# Patient Record
Sex: MALE | Race: BLACK OR AFRICAN AMERICAN | NOT HISPANIC OR LATINO | Employment: FULL TIME | URBAN - METROPOLITAN AREA
[De-identification: names, ages, dates, MRNs, and addresses within clinical notes are randomized per-mention and may not be internally consistent; named-entity substitution may affect disease eponyms.]

---

## 2019-02-27 ENCOUNTER — HOSPITAL ENCOUNTER (EMERGENCY)
Facility: HOSPITAL | Age: 24
Discharge: HOME | End: 2019-02-27
Attending: EMERGENCY MEDICINE | Admitting: EMERGENCY MEDICINE

## 2019-02-27 VITALS
SYSTOLIC BLOOD PRESSURE: 146 MMHG | DIASTOLIC BLOOD PRESSURE: 79 MMHG | HEART RATE: 107 BPM | RESPIRATION RATE: 18 BRPM | BODY MASS INDEX: 23.07 KG/M2 | WEIGHT: 147 LBS | TEMPERATURE: 98.1 F | HEIGHT: 67 IN | OXYGEN SATURATION: 95 %

## 2019-02-27 DIAGNOSIS — N34.2 URETHRITIS: Primary | ICD-10-CM

## 2019-02-27 LAB
BACTERIA UR QL AUTO: ABNORMAL /HPF
BILIRUB UR QL STRIP: NEGATIVE
CLARITY UR: CLEAR
COLOR UR: YELLOW
GLUCOSE UR STRIP-MCNC: NEGATIVE MG/DL
HGB UR QL STRIP.AUTO: NEGATIVE
HYALINE CASTS UR QL AUTO: ABNORMAL /LPF
KETONES UR QL STRIP: ABNORMAL
LEUKOCYTE ESTERASE UR QL STRIP.AUTO: ABNORMAL
NITRITE UR QL STRIP: NEGATIVE
PH UR STRIP.AUTO: 7.5 [PH] (ref 5–8)
PROT UR QL STRIP: ABNORMAL
RBC # UR: ABNORMAL /HPF
REF LAB TEST METHOD: ABNORMAL
SP GR UR STRIP: 1.02 (ref 1–1.03)
SQUAMOUS #/AREA URNS HPF: ABNORMAL /HPF
UROBILINOGEN UR QL STRIP: ABNORMAL
WBC UR QL AUTO: ABNORMAL /HPF

## 2019-02-27 PROCEDURE — 96372 THER/PROPH/DIAG INJ SC/IM: CPT

## 2019-02-27 PROCEDURE — 81001 URINALYSIS AUTO W/SCOPE: CPT | Performed by: EMERGENCY MEDICINE

## 2019-02-27 PROCEDURE — 99283 EMERGENCY DEPT VISIT LOW MDM: CPT

## 2019-02-27 PROCEDURE — 87086 URINE CULTURE/COLONY COUNT: CPT | Performed by: EMERGENCY MEDICINE

## 2019-02-27 PROCEDURE — 25010000002 CEFTRIAXONE PER 250 MG: Performed by: EMERGENCY MEDICINE

## 2019-02-27 RX ORDER — CEFTRIAXONE 1 G/1
1000 INJECTION, POWDER, FOR SOLUTION INTRAMUSCULAR; INTRAVENOUS ONCE
Status: COMPLETED | OUTPATIENT
Start: 2019-02-27 | End: 2019-02-27

## 2019-02-27 RX ORDER — AZITHROMYCIN 250 MG/1
1000 TABLET, FILM COATED ORAL ONCE
Status: COMPLETED | OUTPATIENT
Start: 2019-02-27 | End: 2019-02-27

## 2019-02-27 RX ORDER — CIPROFLOXACIN 500 MG/1
500 TABLET, FILM COATED ORAL 2 TIMES DAILY
Qty: 14 TABLET | Refills: 0 | Status: SHIPPED | OUTPATIENT
Start: 2019-02-27 | End: 2019-03-08 | Stop reason: SDUPTHER

## 2019-02-27 RX ADMIN — CEFTRIAXONE SODIUM 1000 MG: 1 INJECTION, POWDER, FOR SOLUTION INTRAMUSCULAR; INTRAVENOUS at 14:16

## 2019-02-27 RX ADMIN — AZITHROMYCIN 1000 MG: 250 TABLET, FILM COATED ORAL at 14:16

## 2019-03-01 LAB — BACTERIA SPEC AEROBE CULT: NO GROWTH

## 2019-03-07 ENCOUNTER — HOSPITAL ENCOUNTER (EMERGENCY)
Facility: HOSPITAL | Age: 24
Discharge: HOME | End: 2019-03-08
Attending: EMERGENCY MEDICINE | Admitting: EMERGENCY MEDICINE

## 2019-03-07 DIAGNOSIS — N34.2 URETHRITIS: Primary | ICD-10-CM

## 2019-03-07 LAB
BACTERIA UR QL AUTO: ABNORMAL /HPF
BILIRUB UR QL STRIP: NEGATIVE
CLARITY UR: CLEAR
COLOR UR: ABNORMAL
GLUCOSE UR STRIP-MCNC: NEGATIVE MG/DL
HGB UR QL STRIP.AUTO: NEGATIVE
HYALINE CASTS UR QL AUTO: ABNORMAL /LPF
KETONES UR QL STRIP: NEGATIVE
LEUKOCYTE ESTERASE UR QL STRIP.AUTO: NEGATIVE
NITRITE UR QL STRIP: NEGATIVE
PH UR STRIP.AUTO: <=5 [PH] (ref 5–8)
PROT UR QL STRIP: ABNORMAL
RBC # UR: ABNORMAL /HPF
REF LAB TEST METHOD: ABNORMAL
SP GR UR STRIP: 1.03 (ref 1–1.03)
SQUAMOUS #/AREA URNS HPF: ABNORMAL /HPF
UROBILINOGEN UR QL STRIP: ABNORMAL
WBC UR QL AUTO: ABNORMAL /HPF

## 2019-03-07 PROCEDURE — 81001 URINALYSIS AUTO W/SCOPE: CPT | Performed by: EMERGENCY MEDICINE

## 2019-03-07 PROCEDURE — 87491 CHLMYD TRACH DNA AMP PROBE: CPT | Performed by: EMERGENCY MEDICINE

## 2019-03-07 PROCEDURE — 87591 N.GONORRHOEAE DNA AMP PROB: CPT | Performed by: EMERGENCY MEDICINE

## 2019-03-07 PROCEDURE — 99283 EMERGENCY DEPT VISIT LOW MDM: CPT

## 2019-03-08 VITALS
BODY MASS INDEX: 21.55 KG/M2 | WEIGHT: 137.3 LBS | SYSTOLIC BLOOD PRESSURE: 114 MMHG | RESPIRATION RATE: 18 BRPM | DIASTOLIC BLOOD PRESSURE: 72 MMHG | HEIGHT: 67 IN | OXYGEN SATURATION: 96 % | HEART RATE: 66 BPM | TEMPERATURE: 98.1 F

## 2019-03-08 RX ORDER — CIPROFLOXACIN 500 MG/1
500 TABLET, FILM COATED ORAL 2 TIMES DAILY
Qty: 14 TABLET | Refills: 0 | OUTPATIENT
Start: 2019-03-08 | End: 2019-06-18

## 2019-03-08 NOTE — ED PROVIDER NOTES
" EMERGENCY DEPARTMENT ENCOUNTER    CHIEF COMPLAINT  Chief Complaint: difficulty urinating  History given by: patient  History limited by: nothing  Room Number: 19/19  PMD: Provider, No Known      HPI:  Pt is a 23 y.o. male who presents complaining of difficulty urinating over the last 2 days. Pt states that he then developed an intermittent \"watery\" sensation on his buttocks and down his bilateral thighs. Pt denies bloody stool, fever, N/V, penile discharge or drainage.    Duration:  2 days  Onset: gradual  Timing: intermittent  Location:   Radiation: N/A  Quality: difficulty urinating  Intensity/Severity: moderate  Progression: worsening  Associated Symptoms: \"watery\" sensation on buttocks and bilateral thighs  Aggravating Factors: none  Alleviating Factors: none  Previous Episodes: none mentioned  Treatment before arrival: none    PAST MEDICAL HISTORY  Active Ambulatory Problems     Diagnosis Date Noted   • No Active Ambulatory Problems     Resolved Ambulatory Problems     Diagnosis Date Noted   • No Resolved Ambulatory Problems     No Additional Past Medical History       PAST SURGICAL HISTORY  History reviewed. No pertinent surgical history.    FAMILY HISTORY  History reviewed. No pertinent family history.    SOCIAL HISTORY  Social History     Socioeconomic History   • Marital status: Single     Spouse name: Not on file   • Number of children: Not on file   • Years of education: Not on file   • Highest education level: Not on file   Social Needs   • Financial resource strain: Not on file   • Food insecurity - worry: Not on file   • Food insecurity - inability: Not on file   • Transportation needs - medical: Not on file   • Transportation needs - non-medical: Not on file   Occupational History   • Not on file   Tobacco Use   • Smoking status: Never Smoker   Substance and Sexual Activity   • Alcohol use: No     Frequency: Never   • Drug use: No   • Sexual activity: Not on file   Other Topics Concern   • Not on " "file   Social History Narrative   • Not on file       ALLERGIES  Patient has no known allergies.    REVIEW OF SYSTEMS  Review of Systems   Constitutional: Negative for activity change, appetite change and fever.   HENT: Negative for congestion and sore throat.    Eyes: Negative.    Respiratory: Negative for cough and shortness of breath.    Cardiovascular: Negative for chest pain and leg swelling.   Gastrointestinal: Negative for abdominal pain, diarrhea and vomiting.        (+)  \"watery\" sensation on buttocks and bilateral thighs   Endocrine: Negative.    Genitourinary: Positive for difficulty urinating. Negative for decreased urine volume, discharge, dysuria and penile pain.   Musculoskeletal: Negative for neck pain.   Skin: Negative for rash and wound.   Allergic/Immunologic: Negative.    Neurological: Negative for weakness, numbness and headaches.   Hematological: Negative.    Psychiatric/Behavioral: Negative.    All other systems reviewed and are negative.      PHYSICAL EXAM  ED Triage Vitals   Temp Heart Rate Resp BP SpO2   03/07/19 2227 03/07/19 2227 03/07/19 2227 03/07/19 2230 03/07/19 2227   98.1 °F (36.7 °C) 111 16 123/79 96 %      Temp src Heart Rate Source Patient Position BP Location FiO2 (%)   03/07/19 2227 03/07/19 2227 -- -- --   Tympanic Monitor          Physical Exam   Constitutional: He is oriented to person, place, and time. No distress.   HENT:   Head: Normocephalic and atraumatic.   Eyes: EOM are normal. Pupils are equal, round, and reactive to light.   Neck: Normal range of motion. Neck supple.   Cardiovascular: Normal rate, regular rhythm and normal heart sounds.   Pulmonary/Chest: Effort normal and breath sounds normal. No respiratory distress.   Abdominal: Soft. There is no tenderness. There is no rebound, no guarding and no CVA tenderness.   Genitourinary: He exhibits no abnormal testicular mass, no testicular tenderness and no scrotal tenderness. No discharge found.   Musculoskeletal: " Normal range of motion. He exhibits no edema.   Neurological: He is alert and oriented to person, place, and time. He has normal sensation and normal strength.   No saddle anesthesias   Skin: Skin is warm and dry.   Psychiatric: Mood and affect normal.   Nursing note and vitals reviewed.      LAB RESULTS  Lab Results (last 24 hours)     Procedure Component Value Units Date/Time    Urinalysis With Microscopic If Indicated (No Culture) - Urine, Clean Catch [[id number]]  (Abnormal) Collected:  03/07/19 2333    Specimen:  Urine, Clean Catch Updated:  03/07/19 2350     Color, UA Dark Yellow     Appearance, UA Clear     pH, UA <=5.0     Specific Gravity, UA 1.028     Glucose, UA Negative     Ketones, UA Negative     Bilirubin, UA Negative     Blood, UA Negative     Protein, UA 30 mg/dL (1+)     Leuk Esterase, UA Negative     Nitrite, UA Negative     Urobilinogen, UA 1.0 E.U./dL    Urinalysis, Microscopic Only - Urine, Clean Catch [[id number]]  (Abnormal) Collected:  03/07/19 2333    Specimen:  Urine, Clean Catch Updated:  03/07/19 2350     RBC, UA 0-2 /HPF      WBC, UA 3-5 /HPF      Bacteria, UA None Seen /HPF      Squamous Epithelial Cells, UA 0-2 /HPF      Hyaline Casts, UA 7-12 /LPF      Methodology Automated Microscopy          I ordered the above labs and reviewed the results      PROCEDURES  Procedures      PROGRESS AND CONSULTS     2329- D/w pt that the pt could have a prostate infection that is causing his symptoms. Discussed the plan to order lab work for further evaluation. Pt understands and agrees with the plan, all questions answered.    2330- Ordered lab work for further evaluation.    0029- Rechecked pt. Pt is resting comfortably. Notified pt of his UA results and that he likely still has some inflammation from his previous infection. Pt denies eye erythema or arthralgias. Discussed the plan to discharge the pt home with a prescription for abx and a referral to urology. Pt understands and agrees with the  plan, all questions answered.      MEDICAL DECISION MAKING  Results were reviewed/discussed with the patient and they were also made aware of online access. Pt also made aware that some labs, such as cultures, will not be resulted during ER visit and follow up with PMD is necessary.     MDM  Number of Diagnoses or Management Options  Urethritis:      Amount and/or Complexity of Data Reviewed  Clinical lab tests: ordered and reviewed (3-5 WBCs on UA)  Decide to obtain previous medical records or to obtain history from someone other than the patient: yes  Review and summarize past medical records: yes (Pt was seen at  on 2/22/19 and 2/27/19 for assessment of STD exposure. Pt was negative for Chlamydia, Gonorrhea and Trichomonas at that time. Pt was seen in the ED on 2/27/19 and diagnosed with uretheritis. Pt was treated with rocephin and zithromax in the ED and discharged with a prescription for Cipro.)    Patient Progress  Patient progress: stable         DIAGNOSIS  Final diagnoses:   Urethritis       DISPOSITION  DISCHARGE    Patient discharged in stable condition.    Reviewed implications of results, diagnosis, meds, responsibility to follow up, warning signs and symptoms of possible worsening, potential complications and reasons to return to ER.    Patient/Family voiced understanding of above instructions.    Discussed plan for discharge, as there is no emergent indication for admission. Patient referred to primary care provider for BP management due to today's BP. Pt/family is agreeable and understands need for follow up and repeat testing.  Pt is aware that discharge does not mean that nothing is wrong but it indicates no emergency is present that requires admission and they must continue care with follow-up as given below or physician of their choice.     FOLLOW-UP  [facility]  [address] [facility] [id number] [phone]  Schedule an appointment as soon as possible for a visit             Medication List      No changes were made to your prescriptions during this visit.           Latest Documented Vital Signs:  As of 12:43 AM  BP- 116/74 HR- 72 Temp- 98.1 °F (36.7 °C) (Tympanic) O2 sat- 96%    --  Documentation assistance provided by casimiro Pena for Dr. Parada.  Information recorded by the casimiro was done at my direction and has been verified and validated by me.     Mayra Pena  03/08/19 0043       Mitchel Parada MD  03/08/19 3411

## 2019-03-08 NOTE — ED TRIAGE NOTES
To ER via [facility].  Pt was seen approx 1 week ago and dx with ureteritis.  Pt was given Cipro and completed course.  Pt difficulty with urination, slow start to stream.  Pt also c/o watery stools x 3-4 days.

## 2019-03-11 LAB
C TRACH RRNA SPEC DONR QL NAA+PROBE: NEGATIVE
N GONORRHOEA DNA SPEC QL NAA+PROBE: NEGATIVE

## 2019-06-18 ENCOUNTER — HOSPITAL ENCOUNTER (EMERGENCY)
Facility: HOSPITAL | Age: 24
Discharge: HOME | End: 2019-06-18
Attending: EMERGENCY MEDICINE | Admitting: EMERGENCY MEDICINE

## 2019-06-18 VITALS
HEART RATE: 98 BPM | RESPIRATION RATE: 18 BRPM | HEIGHT: 67 IN | DIASTOLIC BLOOD PRESSURE: 73 MMHG | SYSTOLIC BLOOD PRESSURE: 120 MMHG | TEMPERATURE: 98.5 F | OXYGEN SATURATION: 96 % | WEIGHT: 150 LBS | BODY MASS INDEX: 23.54 KG/M2
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
## 2019-06-18 DIAGNOSIS — S39.012A STRAIN OF LUMBAR REGION, INITIAL ENCOUNTER: Primary | ICD-10-CM

## 2019-06-18 PROCEDURE — 99282 EMERGENCY DEPT VISIT SF MDM: CPT

## 2019-06-18 RX ORDER — CYCLOBENZAPRINE HCL 10 MG
10 TABLET ORAL 3 TIMES DAILY PRN
Qty: 21 TABLET | Refills: 0 | Status: SHIPPED | OUTPATIENT
Start: 2019-06-18

## 2019-06-18 RX ORDER — IBUPROFEN 600 MG/1
600 TABLET ORAL EVERY 8 HOURS PRN
Qty: 21 TABLET | Refills: 0 | Status: SHIPPED | OUTPATIENT
Start: 2019-06-18

## 2020-07-03 ENCOUNTER — HOSPITAL ENCOUNTER (EMERGENCY)
Facility: HOSPITAL | Age: 25
Discharge: HOME | End: 2020-07-04
Attending: EMERGENCY MEDICINE | Admitting: EMERGENCY MEDICINE

## 2020-07-03 DIAGNOSIS — F32.A DEPRESSION, UNSPECIFIED DEPRESSION TYPE: Primary | ICD-10-CM

## 2020-07-03 DIAGNOSIS — F19.10 POLYSUBSTANCE ABUSE (HCC): ICD-10-CM

## 2020-07-03 LAB
AMPHET+METHAMPHET UR QL: NEGATIVE
BARBITURATES UR QL SCN: NEGATIVE
BENZODIAZ UR QL SCN: POSITIVE
CANNABINOIDS SERPL QL: POSITIVE
COCAINE UR QL: NEGATIVE
ETHANOL BLD-MCNC: <10 MG/DL (ref 0–10)
ETHANOL UR QL: <0.01 %
METHADONE UR QL SCN: NEGATIVE
OPIATES UR QL: NEGATIVE
OXYCODONE UR QL SCN: NEGATIVE

## 2020-07-03 PROCEDURE — 80307 DRUG TEST PRSMV CHEM ANLYZR: CPT | Performed by: EMERGENCY MEDICINE

## 2020-07-03 PROCEDURE — 99285 EMERGENCY DEPT VISIT HI MDM: CPT

## 2020-07-04 VITALS
SYSTOLIC BLOOD PRESSURE: 104 MMHG | TEMPERATURE: 96.8 F | HEART RATE: 68 BPM | BODY MASS INDEX: 21.05 KG/M2 | RESPIRATION RATE: 16 BRPM | WEIGHT: 131 LBS | OXYGEN SATURATION: 97 % | HEIGHT: 66 IN | DIASTOLIC BLOOD PRESSURE: 71 MMHG

## 2020-07-04 PROCEDURE — 90791 PSYCH DIAGNOSTIC EVALUATION: CPT

## 2020-07-04 NOTE — ED NOTES
"Pt presents to ED c/o overdose on Xanax x4-5 pills. 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 Pt states he would like to get help for his depression and anxiety. Pt rates depression as 8/10 and anxiety 7/10 at this time. Pt presents as anxious, depressed, and cooperative. Pt does feel \"a little sleepy\" at this time. Charge RN and MD Trotter notified c sitter at bedside. SI precautions in place. Pt denies HI.      Isidro Flores, RN  07/03/20 5019    "

## 2020-07-04 NOTE — ED TRIAGE NOTES
"To ER via EMS from home.  Call was for out of control person.  Pt was being held down prior to EMS arrival.  Family states pt then passed out.  Pt is now calm and cooperative.  Pt stated to EMS that he took a \"Xanny bar\" 2mg and \"weed\". Vomited x 1 with EMS.  Pt states drowsy at this time.      Pt in mask at triage.  Triage staff placed in appropriate PPE.   "

## 2020-07-04 NOTE — ED PROVIDER NOTES
" EMERGENCY DEPARTMENT ENCOUNTER    Room Number:  10/10  Date of encounter:  7/4/2020  PCP: Rosaline Link NP  Historian: Patient     I used full protective equipment while examining this patient.  This includes face mask, gloves and protective eyewear.  I washed my hands before entering the room and immediately upon leaving the room.  Patient was wearing a surgical mask.      HPI:  Chief Complaint: Depression  A complete HPI/ROS/PMH/PSH/SH/FH are unobtainable due to: None    Context: Hunter Shin is a 24 y.o. male who presents to the ED c/o depression for the past 3 to 4 years.  He reports that his symptoms have been gradually worsening and that recently he has had thoughts of harming himself.  He states that \"nothing is going my way\".  He states that he does not feel like there is anyone he can really talk to. Earlier today, patient states he took 4-5 Xanax bars in an attempt to harm himself.  He reports that he also used marijuana today.  He denies drinking any alcohol or taking any other drugs.  Patient states he also got into an argument with his stepfather this afternoon and was very upset.  EMS was called for \"out-of-control person\".  Patient is now calm and cooperative.  He denies any injury, recent illness, cough, chest pain, shortness of breath, nausea, vomiting, abdominal pain, dysuria, or headache.      PAST MEDICAL HISTORY  Active Ambulatory Problems     Diagnosis Date Noted   • No Active Ambulatory Problems     Resolved Ambulatory Problems     Diagnosis Date Noted   • No Resolved Ambulatory Problems     No Additional Past Medical History         PAST SURGICAL HISTORY  History reviewed. No pertinent surgical history.      FAMILY HISTORY  No family history on file.      SOCIAL HISTORY  Social History     Socioeconomic History   • Marital status: Single     Spouse name: Not on file   • Number of children: Not on file   • Years of education: Not on file   • Highest education level: Not on file "   Tobacco Use   • Smoking status: Never Smoker   Substance and Sexual Activity   • Alcohol use: No     Frequency: Never   • Drug use: Defer   • Sexual activity: Defer         ALLERGIES  Patient has no known allergies.       REVIEW OF SYSTEMS  Review of Systems      All systems have been reviewed and are negative except as as discussed in the HPI    PHYSICAL EXAM    I have reviewed the triage vital signs and nursing notes.    ED Triage Vitals [07/03/20 2117]   Temp Heart Rate Resp BP SpO2   96.8 °F (36 °C) 97 16 114/70 100 %      Temp src Heart Rate Source Patient Position BP Location FiO2 (%)   Tympanic -- -- -- --       Physical Exam  GENERAL: Awake, alert, calm, cooperative  HENT: NCAT, nares patent, moist mucous membranes  NECK: supple, no lymphadenopathy  EYES: no scleral icterus  CV: regular rhythm, regular rate, no murmur  RESPIRATORY: normal effort, clear to auscultation bilaterally  ABDOMEN: soft, nontender, nondistended  MUSCULOSKELETAL: Extremities are nontender and without obvious deformity.  There is normal range of motion in all extremities.  There is no calf tenderness or pedal edema  NEURO: Strength, sensation, and coordination are grossly intact.  Speech and mentation are unremarkable.  No facial droop.  SKIN: warm, dry, no rash  PSYCH: Depressed mood with positive suicidal ideation but does not have a specific plan.      LAB RESULTS  Recent Results (from the past 24 hour(s))   Ethanol    Collection Time: 07/03/20 10:14 PM   Result Value Ref Range    Ethanol <10 0 - 10 mg/dL    Ethanol % <0.010 %   Urine Drug Screen - Urine, Clean Catch    Collection Time: 07/03/20 10:19 PM   Result Value Ref Range    Amphet/Methamphet, Screen Negative Negative    Barbiturates Screen, Urine Negative Negative    Benzodiazepine Screen, Urine Positive (A) Negative    Cocaine Screen, Urine Negative Negative    Opiate Screen Negative Negative    THC, Screen, Urine Positive (A) Negative    Methadone Screen, Urine Negative  Negative    Oxycodone Screen, Urine Negative Negative       Ordered the above labs and independently reviewed the results.      RADIOLOGY  No Radiology Exams Resulted Within Past 24 Hours    I ordered the above noted radiological studies. Reviewed by me and discussed with radiologist.  See dictation for official radiology interpretation.      PROCEDURES  Procedures      MEDICATIONS GIVEN IN ER    Medications - No data to display      PROGRESS, DATA ANALYSIS, CONSULTS, AND MEDICAL DECISION MAKING    All labs have been independently reviewed by me.  All radiology studies have been reviewed by me and discussed with radiologist dictating the report.   EKG's independently viewed and interpreted by me.  I have reviewed the nurse's notes, vital signs, past medical history, and medication list.  Discussion below represents my analysis of pertinent findings related to patient's condition, differential diagnosis, treatment plan and final disposition.      ED Course as of Jul 04 1551   Fri Jul 03, 2020   2309 Patient's care was turned over to Dr. Marquez.  Plan is to have the patient evaluated by access.    [WH]      ED Course User Index  [WH] Scott Trotter MD       AS OF 15:51 VITALS:    BP - 104/71  HR - 68  TEMP - 96.8 °F (36 °C) (Tympanic)  O2 SATS - 97%      DIAGNOSIS  Final diagnoses:   Depression, unspecified depression type   Polysubstance abuse (CMS/[facility])         DISPOSITION  Pending    Please note that this document was completed using voice recognition software     Scott Trotter MD  07/04/20 0436

## 2020-07-04 NOTE — ED PROVIDER NOTES
0530 - Patient has been sleeping comfortably throughout shift. His vitals have remained stable throughout the shift. He is now awake and being interviewed by ACCESS.    0705 - Patient evaluated by ACCESS and feels appropriate for outpatient therapy, referrals given to [facility] and [facility].     Brian Marquez MD  07/04/20 0638       Brian Marquez MD  07/04/20 0746

## 2020-07-04 NOTE — NURSING NOTE
"I approached pt earlier, was sleeping. I spoke with BOGDAN Arredondo and MD Marquez about allowing pt to sleep off his reported use of xanax tonight. DEENA Salinas reports to me that pt has been asleep for almost 8 hours at this point. Pt awakens to voice, appears alert at this time.     Reports that he was BIB EMS tonight, and does remember coming in, but isn't sure who called EMS.     Asked what was going on at the time, \"I was sitting outside, and my father pulled up, and he asked me what was wrong, and by that I time, I had took, I wanna say, 5 of the xanny bars ... I was just like laying on the collazo of the car, and he asked me what was wrong, and I honestly just told him the truth .... My real father is not in my life ... Having a real father and having a step dad is 2 different things ... One stepped up to the plate and one\", reports that sometimes father ... \"makes me feel like an outsider, like I'm only being loved, because he wants to be with my mother\". Reports that his parents don't let him express things and has to keep it to myself \"and I got to do something that I don't want to do, in order to get some help\". Reports that \"my parents tell me that they care about our feelings, but at the same time, that shit's irrelevant ... Sometimes it makes me feel less wanted, and I just get in my moods to where I think of ways to kill myself. And I'm not going to lie to you, when I think of killing myself, it's something that I'm gonna do, but I got a little brother and a sister ... and I want to do something that's going to make my parents realize that ... I need them ... Have you ever seen those parents that listen but don't listen\".     Reports that he has been looking for guidance from his parents, and they just tell him he was wrong.     Reports that he has never gotten any counseling.     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 Went to [facility] in [place], got depressed towards the end and " "dropped out.     Asked if he thought that 5 xanax would kill him, \"I was gonna take more\".     Asked what stopped him, reports that he believes that his father stopped him from taking more. Reports that he feels that his parents are disappointed in him from not completing the program.     Reports that wanting attention from parents and wanting them to know how bad he feels is a \"major factor\" in his choices tonight, when asked about this.     Asked how his dad happened to show up there tonight, reports he was sitting on his sister's car \"just laying on the truck\" outside the house he shares with mom, brother, sister. Reports that \"step dad\" and mom are not legally , and that step dad has his own residence and only stays there part of the time.     Reports that he got in an argument with step dad tonight, \"I had expressed to him some words that I thought were meaningful ... And he took it wrong, and it kind of caused an argument\".     Asked how he feels about the fact that he didn't die tonight, \"on a scale of 1 to 10, 1 being the least happy [that he didn't die] and 10 being the most happy, I would say about a 5 or a 6\".     Denies any alcohol use. Reports he smokes marijuana daily.     Reports he got the xanax from \"a friend\", reports tonight is the first time he has ever used it in his life. Reports that he only had 5 pills of xanax tonight. Asked about comment that he would have taken more, \"I was gonna try to get some more\".     Denies thinking that his drug use is problem for him.     Asked again if he thought that 5 pills would kill him, pt again states that \"I'm not going to lie to you. I was gonna try to get some more\". Appearing to evade the question.     Reports he has been furloughed by [employer], but planning to go back. Reports that he is going back to work with [employer] next Thursday (5 days from now), that he liked is work and is looking forward to going back there (future oriented). Asked if he thinks " "he will still be alive next Thursday, \"uh, yes sir. I'm not going to lie to you. I only feel this way when my parents, basically I feel irrelevant to them\".     Reports that he has had \"like 6 friends die, in the last 4 years ... From murder\".     I commented that he appeared to be seeking their attention in his suicidal gesture tonight, pt replies that, \"I just want them to feel how I feel, because don't nobody care until it's too late ... Besides doctors\".     Asked if he can be safe until he starts work again, if he goes home tonight, \"yes sir\".     Agrees to safety plan to tell family, call 911, or return to ED if he starts thinking about taking another OD or harming himself in the future. Pt states that he can, asks me for a number to call, educated that I will provide a crisis hotline number, but that the only number he needs to remember is 911, and states he understands and can agree to this.     Reports that one of the friends he was talking about  last night (7/3/20). Reports that \"people die every day, and my group of friend is dying easily\".     Asked about self harm behavior without suicidal intent, denies cutting or burning self, but reports that he drank alcohol to harm himself, \"about a month ago\". Reports that prior this this he had had \"like 3 drinks in my life\".     I spoke with Ana Rosa, [phone] pt's GF of 2 years. Pt provides consent to share PHI with GF. GF reports that family does know pt has been here tonight (and haven't tried to reach out to staff), indicating that she is closest with pt for collateral to me. GF reports that she and pt had a bit of an argument last night, and pt told her that he wanted to be by himself tonight, so that he would not say something to her that he would regret later, states he had not told her of friend dying last night, but confirms that pt has had several friend die of murder in past few years.     Asked what she thinks pt was trying to do in taking 5 " "xanax, \"I think it's literally a cry for help, he's trying to talk to everyone and no one's listening\". GF denies thinking that pt was trying to kill himself on f/u.     Plan to provide outpatient and IOP referrals, [facility] IOP currently full. Dr. Marquez agrees with plan.                           "

## 2023-06-14 ENCOUNTER — OFFICE VISIT (OUTPATIENT)
Dept: FAMILY MEDICINE CLINIC | Facility: CLINIC | Age: 28
End: 2023-06-14
Payer: MEDICAID

## 2023-06-14 VITALS
WEIGHT: 126 LBS | SYSTOLIC BLOOD PRESSURE: 106 MMHG | BODY MASS INDEX: 20.25 KG/M2 | TEMPERATURE: 98 F | HEIGHT: 66 IN | OXYGEN SATURATION: 97 % | RESPIRATION RATE: 18 BRPM | HEART RATE: 90 BPM | DIASTOLIC BLOOD PRESSURE: 70 MMHG

## 2023-06-14 DIAGNOSIS — Z11.3 SCREEN FOR STD (SEXUALLY TRANSMITTED DISEASE): ICD-10-CM

## 2023-06-14 DIAGNOSIS — R35.0 URINARY FREQUENCY: Primary | ICD-10-CM

## 2023-06-14 DIAGNOSIS — Z00.00 ENCOUNTER FOR MEDICAL EXAMINATION TO ESTABLISH CARE: ICD-10-CM

## 2023-06-14 PROCEDURE — 99203 OFFICE O/P NEW LOW 30 MIN: CPT | Performed by: NURSE PRACTITIONER

## 2023-06-14 NOTE — PROGRESS NOTES
"Chief Complaint  following sex (Had strange sensation when voiding went to ER 3/19 now having strange sensations again now all over.girlfriend just checked for std all neg)    Subjective        Hunter Shin presents to [facility]  History of Present Illness  Patient presents to the office today to establish care.  He reports he has had a strange sensation when urinating.  He would also like to be screened for all STDs.  He reports girlfriend just had a screening check and was negative.  Blood pressure is 106/70.    Objective   Vital Signs:  /70 (BP Location: Left arm, Patient Position: Sitting, Cuff Size: Adult)   Pulse 90   Temp 98 °F (36.7 °C) (Infrared)   Resp 18   Ht 167.6 cm (66\")   Wt 57.2 kg (126 lb)   SpO2 97%   BMI 20.34 kg/m²   Estimated body mass index is 20.34 kg/m² as calculated from the following:    Height as of this encounter: 167.6 cm (66\").    Weight as of this encounter: 57.2 kg (126 lb).       BMI is within normal parameters. No other follow-up for BMI required.      Physical Exam  Constitutional:       General: He is not in acute distress.     Appearance: Normal appearance.   HENT:      Head: Normocephalic.   Eyes:      Pupils: Pupils are equal, round, and reactive to light.   Cardiovascular:      Rate and Rhythm: Normal rate.      Pulses: Normal pulses.      Heart sounds: Normal heart sounds.   Pulmonary:      Effort: Pulmonary effort is normal.      Breath sounds: Normal breath sounds.   Musculoskeletal:         General: Normal range of motion.      Cervical back: Normal range of motion and neck supple.   Skin:     General: Skin is warm.   Neurological:      General: No focal deficit present.      Mental Status: He is alert and oriented to person, place, and time.   Psychiatric:         Mood and Affect: Mood normal.         Behavior: Behavior normal.         Thought Content: Thought content normal.         Judgment: Judgment normal.      Result " Review :                   Assessment and Plan   Diagnoses and all orders for this visit:    1. Urinary frequency (Primary)  -     Urinalysis With Culture If Indicated -  -     Microscopic Examination -    2. Screen for STD (sexually transmitted disease)  -     Chlamydia trachomatis, Neisseria gonorrhoeae, Trichomonas vaginalis, PCR - Swab, Urine, Clean Catch  -     HIV-1 / O / 2 Ag / Antibody 4th Generation  -     HSV 1 Antibody, IgG  -     HSV 2 Antibody, IgG  -     RPR, Rfx Qn RPR / Confirm TP  -     Urinalysis With Culture If Indicated -    3. Encounter for medical examination to establish care             Follow Up   No follow-ups on file.  Patient was given instructions and counseling regarding his condition or for health maintenance advice. Please see specific information pulled into the AVS if appropriate.

## 2023-06-15 LAB
APPEARANCE UR: ABNORMAL
BACTERIA #/AREA URNS HPF: NORMAL /[HPF]
BILIRUB UR QL STRIP: NEGATIVE
C TRACH RRNA SPEC QL NAA+PROBE: NEGATIVE
CASTS URNS QL MICRO: NORMAL /LPF
COLOR UR: YELLOW
EPI CELLS #/AREA URNS HPF: NORMAL /HPF (ref 0–10)
GLUCOSE UR QL STRIP: NEGATIVE
HGB UR QL STRIP: NEGATIVE
HIV 1+2 AB+HIV1 P24 AG SERPL QL IA: NON REACTIVE
HSV1 IGG SER IA-ACNC: 1.32 INDEX (ref 0–0.9)
HSV2 IGG SER IA-ACNC: <0.91 INDEX (ref 0–0.9)
KETONES UR QL STRIP: NEGATIVE
LEUKOCYTE ESTERASE UR QL STRIP: NEGATIVE
MICRO URNS: ABNORMAL
MICRO URNS: ABNORMAL
N GONORRHOEA RRNA SPEC QL NAA+PROBE: NEGATIVE
NITRITE UR QL STRIP: NEGATIVE
PH UR STRIP: 6.5 [PH] (ref 5–7.5)
PROT UR QL STRIP: NEGATIVE
RBC #/AREA URNS HPF: NORMAL /HPF (ref 0–2)
RPR SER QL: NON REACTIVE
SP GR UR STRIP: 1.02 (ref 1–1.03)
T VAGINALIS RRNA SPEC QL NAA+PROBE: NEGATIVE
URINALYSIS REFLEX: ABNORMAL
UROBILINOGEN UR STRIP-MCNC: 0.2 MG/DL (ref 0.2–1)
WBC #/AREA URNS HPF: NORMAL /HPF (ref 0–5)

## 2023-06-19 PROBLEM — Z00.00 ENCOUNTER FOR MEDICAL EXAMINATION TO ESTABLISH CARE: Status: ACTIVE | Noted: 2023-06-19

## 2023-06-19 PROBLEM — Z11.3 SCREEN FOR STD (SEXUALLY TRANSMITTED DISEASE): Status: ACTIVE | Noted: 2023-06-19

## 2023-06-19 PROBLEM — R35.0 URINARY FREQUENCY: Status: ACTIVE | Noted: 2023-06-19

## 2023-07-19 PROBLEM — R36.9 PENILE DISCHARGE: Status: ACTIVE | Noted: 2019-07-03

## 2024-05-03 DIAGNOSIS — B00.9 HSV-1 INFECTION: ICD-10-CM

## 2024-05-03 RX ORDER — VALACYCLOVIR HYDROCHLORIDE 1 G/1
1000 TABLET, FILM COATED ORAL 2 TIMES DAILY
Qty: 20 TABLET | Refills: 3 | Status: SHIPPED | OUTPATIENT
Start: 2024-05-03

## 2025-03-22 DIAGNOSIS — B00.9 HSV-1 INFECTION: ICD-10-CM

## 2025-03-24 RX ORDER — VALACYCLOVIR HYDROCHLORIDE 1 G/1
1000 TABLET, FILM COATED ORAL 2 TIMES DAILY
Qty: 20 TABLET | Refills: 3 | OUTPATIENT
Start: 2025-03-24

## 2025-04-08 ENCOUNTER — OFFICE VISIT (OUTPATIENT)
Dept: FAMILY MEDICINE CLINIC | Facility: CLINIC | Age: 30
End: 2025-04-08
Payer: MEDICAID

## 2025-04-08 VITALS
BODY MASS INDEX: 20.73 KG/M2 | DIASTOLIC BLOOD PRESSURE: 86 MMHG | HEART RATE: 106 BPM | HEIGHT: 66 IN | WEIGHT: 129 LBS | OXYGEN SATURATION: 98 % | TEMPERATURE: 98.9 F | SYSTOLIC BLOOD PRESSURE: 136 MMHG

## 2025-04-08 DIAGNOSIS — Z11.3 SCREEN FOR STD (SEXUALLY TRANSMITTED DISEASE): ICD-10-CM

## 2025-04-08 DIAGNOSIS — B00.9 HSV-1 INFECTION: ICD-10-CM

## 2025-04-08 DIAGNOSIS — Z00.00 ANNUAL PHYSICAL EXAM: Primary | ICD-10-CM

## 2025-04-08 RX ORDER — VALACYCLOVIR HYDROCHLORIDE 1 G/1
1000 TABLET, FILM COATED ORAL 2 TIMES DAILY
Qty: 20 TABLET | Refills: 3 | Status: SHIPPED | OUTPATIENT
Start: 2025-04-08

## 2025-04-08 NOTE — PROGRESS NOTES
"Chief Complaint  Annual Exam (Due TDAP/Not fasting/)    Subjective        Hunter Shin presents to [facility]  History of Present Illness  Patient presents office today for annual physical exam.  Blood pressure is 136/86.  Patient denies chest pain shortness of air.  Patient does not smoke drink and/or do drug use.  Patient has history of herpes simplex virus type I.  Will check all updated labs.  Patient today would like STD screening test.      Objective   Vital Signs:  /86 (BP Location: Left arm, Patient Position: Sitting, Cuff Size: Adult)   Pulse 106   Temp 98.9 °F (37.2 °C)   Ht 167.6 cm (65.98\")   Wt 58.5 kg (129 lb)   SpO2 98%   BMI 20.83 kg/m²   Estimated body mass index is 20.83 kg/m² as calculated from the following:    Height as of this encounter: 167.6 cm (65.98\").    Weight as of this encounter: 58.5 kg (129 lb).    BMI is within normal parameters. No other follow-up for BMI required.      Physical Exam  Constitutional:       General: He is not in acute distress.     Appearance: Normal appearance.   HENT:      Head: Normocephalic.      Right Ear: Tympanic membrane normal.      Left Ear: Tympanic membrane normal.   Eyes:      Pupils: Pupils are equal, round, and reactive to light.   Cardiovascular:      Rate and Rhythm: Normal rate.      Pulses: Normal pulses.      Heart sounds: Normal heart sounds.   Pulmonary:      Effort: Pulmonary effort is normal.      Breath sounds: Normal breath sounds.   Abdominal:      General: Bowel sounds are normal.      Palpations: Abdomen is soft.   Musculoskeletal:         General: Normal range of motion.      Cervical back: Normal range of motion and neck supple.   Skin:     General: Skin is warm.   Neurological:      General: No focal deficit present.      Mental Status: He is alert and oriented to person, place, and time.   Psychiatric:         Mood and Affect: Mood normal.         Behavior: Behavior normal.         Thought " Content: Thought content normal.         Judgment: Judgment normal.        Result Review :  The following data was reviewed by: EDWIN Greer on 04/08/2025:              Assessment and Plan   Diagnoses and all orders for this visit:    1. Annual physical exam (Primary)  -     CBC & Differential  -     Comprehensive Metabolic Panel  -     Lipid Panel  -     TSH    2. Screen for STD (sexually transmitted disease)  -     HSV 2 Antibody, IgG  -     RPR, Rfx Qn RPR / Confirm TP  -     HSV 1 Antibody, IgG  -     Chlamydia trachomatis, Neisseria gonorrhoeae, Trichomonas vaginalis, PCR - Swab, Urine, Clean Catch  -     HIV-1 / O / 2 Ag / Antibody    3. HSV-1 infection  -     valACYclovir (VALTREX) 1000 MG tablet; Take 1 tablet by mouth 2 (Two) Times a Day. For Fever Blisters  Dispense: 20 tablet; Refill: 3    Other orders  -     Cancel: Tdap Vaccine => 8yo IM (BOOSTRIX/ADACEL)      Counseling was provided on nutrition, physical activity, development, and injury prevention, dental health, and safe sex practices patient verbalizes understanding no additional questions were asked.         Follow Up   Return if symptoms worsen or fail to improve.  Patient was given instructions and counseling regarding his condition or for health maintenance advice. Please see specific information pulled into the AVS if appropriate.

## 2025-04-10 LAB
ALBUMIN SERPL-MCNC: 4.9 G/DL (ref 4.3–5.2)
ALP SERPL-CCNC: 68 IU/L (ref 44–121)
ALT SERPL-CCNC: 9 IU/L (ref 0–44)
AST SERPL-CCNC: 16 IU/L (ref 0–40)
BASOPHILS # BLD AUTO: 0 X10E3/UL (ref 0–0.2)
BASOPHILS NFR BLD AUTO: 0 %
BILIRUB SERPL-MCNC: 0.8 MG/DL (ref 0–1.2)
BUN SERPL-MCNC: 10 MG/DL (ref 6–20)
BUN/CREAT SERPL: 9 (ref 9–20)
C TRACH RRNA SPEC QL NAA+PROBE: NEGATIVE
CALCIUM SERPL-MCNC: 9.7 MG/DL (ref 8.7–10.2)
CHLORIDE SERPL-SCNC: 102 MMOL/L (ref 96–106)
CHOLEST SERPL-MCNC: 177 MG/DL (ref 100–199)
CO2 SERPL-SCNC: 25 MMOL/L (ref 20–29)
CREAT SERPL-MCNC: 1.12 MG/DL (ref 0.76–1.27)
EGFRCR SERPLBLD CKD-EPI 2021: 92 ML/MIN/1.73
EOSINOPHIL # BLD AUTO: 0.1 X10E3/UL (ref 0–0.4)
EOSINOPHIL NFR BLD AUTO: 1 %
ERYTHROCYTE [DISTWIDTH] IN BLOOD BY AUTOMATED COUNT: 12.1 % (ref 11.6–15.4)
GLOBULIN SER CALC-MCNC: 3.3 G/DL (ref 1.5–4.5)
GLUCOSE SERPL-MCNC: 94 MG/DL (ref 70–99)
HCT VFR BLD AUTO: 50.5 % (ref 37.5–51)
HDLC SERPL-MCNC: 36 MG/DL
HGB BLD-MCNC: 15.9 G/DL (ref 13–17.7)
HIV 1+2 AB+HIV1 P24 AG SERPL QL IA: NON REACTIVE
HSV1 IGG SERPL QL IA: NON REACTIVE
HSV2 IGG SERPL QL IA: NON REACTIVE
IMM GRANULOCYTES # BLD AUTO: 0 X10E3/UL (ref 0–0.1)
IMM GRANULOCYTES NFR BLD AUTO: 0 %
LDLC SERPL CALC-MCNC: 128 MG/DL (ref 0–99)
LYMPHOCYTES # BLD AUTO: 2 X10E3/UL (ref 0.7–3.1)
LYMPHOCYTES NFR BLD AUTO: 43 %
MCH RBC QN AUTO: 26.1 PG (ref 26.6–33)
MCHC RBC AUTO-ENTMCNC: 31.5 G/DL (ref 31.5–35.7)
MCV RBC AUTO: 83 FL (ref 79–97)
MONOCYTES # BLD AUTO: 0.5 X10E3/UL (ref 0.1–0.9)
MONOCYTES NFR BLD AUTO: 11 %
N GONORRHOEA RRNA SPEC QL NAA+PROBE: NEGATIVE
NEUTROPHILS # BLD AUTO: 2.1 X10E3/UL (ref 1.4–7)
NEUTROPHILS NFR BLD AUTO: 45 %
PLATELET # BLD AUTO: 301 X10E3/UL (ref 150–450)
POTASSIUM SERPL-SCNC: 4.2 MMOL/L (ref 3.5–5.2)
PROT SERPL-MCNC: 8.2 G/DL (ref 6–8.5)
RBC # BLD AUTO: 6.1 X10E6/UL (ref 4.14–5.8)
RPR SER QL: NON REACTIVE
SODIUM SERPL-SCNC: 141 MMOL/L (ref 134–144)
T VAGINALIS RRNA SPEC QL NAA+PROBE: NEGATIVE
TRIGL SERPL-MCNC: 66 MG/DL (ref 0–149)
TSH SERPL DL<=0.005 MIU/L-ACNC: 1.05 UIU/ML (ref 0.45–4.5)
VLDLC SERPL CALC-MCNC: 13 MG/DL (ref 5–40)
WBC # BLD AUTO: 4.7 X10E3/UL (ref 3.4–10.8)

## 2025-08-04 DIAGNOSIS — B00.9 HSV-1 INFECTION: ICD-10-CM

## 2025-08-05 RX ORDER — VALACYCLOVIR HYDROCHLORIDE 1 G/1
1000 TABLET, FILM COATED ORAL 2 TIMES DAILY
Qty: 20 TABLET | Refills: 3 | Status: SHIPPED | OUTPATIENT
Start: 2025-08-05

## 2025-08-27 ENCOUNTER — OFFICE VISIT (OUTPATIENT)
Dept: FAMILY MEDICINE CLINIC | Facility: CLINIC | Age: 30
End: 2025-08-27
Payer: MEDICAID

## 2025-08-27 VITALS
SYSTOLIC BLOOD PRESSURE: 126 MMHG | TEMPERATURE: 98 F | BODY MASS INDEX: 21.05 KG/M2 | OXYGEN SATURATION: 96 % | WEIGHT: 131 LBS | DIASTOLIC BLOOD PRESSURE: 84 MMHG | HEIGHT: 66 IN | HEART RATE: 98 BPM

## 2025-08-27 DIAGNOSIS — Z31.41 FERTILITY TESTING: ICD-10-CM

## 2025-08-27 DIAGNOSIS — R33.9 URINARY RETENTION: Primary | ICD-10-CM

## 2025-08-27 DIAGNOSIS — L72.0 EPIDERMAL CYST OF FACE: ICD-10-CM

## 2025-08-27 DIAGNOSIS — R39.12 WEAK URINE STREAM: ICD-10-CM

## 2025-08-27 LAB
BILIRUB BLD-MCNC: NEGATIVE MG/DL
CLARITY, POC: CLEAR
COLOR UR: YELLOW
EXPIRATION DATE: ABNORMAL
GLUCOSE UR STRIP-MCNC: NEGATIVE MG/DL
KETONES UR QL: NEGATIVE
LEUKOCYTE EST, POC: NEGATIVE
Lab: ABNORMAL
NITRITE UR-MCNC: NEGATIVE MG/ML
PH UR: 8.5 [PH] (ref 5–8)
PROT UR STRIP-MCNC: NEGATIVE MG/DL
RBC # UR STRIP: NEGATIVE /UL
SP GR UR: 1.02 (ref 1–1.03)
UROBILINOGEN UR QL: ABNORMAL

## 2025-08-30 PROBLEM — Z31.41 FERTILITY TESTING: Status: ACTIVE | Noted: 2023-06-19

## 2025-08-30 PROBLEM — L72.0 EPIDERMAL CYST OF FACE: Status: ACTIVE | Noted: 2025-08-30

## 2025-08-30 PROBLEM — L72.3 SEBACEOUS CYST: Status: ACTIVE | Noted: 2025-08-30
